# Patient Record
(demographics unavailable — no encounter records)

---

## 2025-07-09 NOTE — PHYSICAL EXAM
[Alert] : alert [Normal Voice/Communication] : normal voice/communication [No Acute Distress] : no acute distress [Well Developed] : well developed [Obese (BMI >= 30)] : obese (BMI >= 30) [Sclera] : the sclera and conjunctiva were normal [Hearing Threshold Finger Rub Not [place]] : hearing was normal [Normal Appearance] : the appearance of the neck was normal [No Respiratory Distress] : no respiratory distress [No Acc Muscle Use] : no accessory muscle use [Respiration, Rhythm And Depth] : normal respiratory rhythm and effort [Auscultation Breath Sounds / Voice Sounds] : lungs were clear to auscultation bilaterally [Heart Rate And Rhythm] : heart rate was normal and rhythm regular [Normal S1, S2] : normal S1 and S2 [Bowel Sounds] : normal bowel sounds [Abdomen Tenderness] : non-tender [No Masses] : no abdominal mass palpated [Abdomen Soft] : soft [Abnormal Walk] : normal gait [Normal Color / Pigmentation] : normal skin color and pigmentation [Oriented To Time, Place, And Person] : oriented to person, place, and time

## 2025-07-09 NOTE — PHYSICAL EXAM
[Alert] : alert [Normal Voice/Communication] : normal voice/communication [No Acute Distress] : no acute distress [Well Developed] : well developed [Obese (BMI >= 30)] : obese (BMI >= 30) [Sclera] : the sclera and conjunctiva were normal [Hearing Threshold Finger Rub Not [place]] : hearing was normal [Normal Appearance] : the appearance of the neck was normal [No Respiratory Distress] : no respiratory distress [No Acc Muscle Use] : no accessory muscle use [Respiration, Rhythm And Depth] : normal respiratory rhythm and effort [Auscultation Breath Sounds / Voice Sounds] : lungs were clear to auscultation bilaterally [Heart Rate And Rhythm] : heart rate was normal and rhythm regular [Normal S1, S2] : normal S1 and S2 [Bowel Sounds] : normal bowel sounds [Abdomen Tenderness] : non-tender [No Masses] : no abdominal mass palpated [Abdomen Soft] : soft [Abnormal Walk] : normal gait [Normal Color / Pigmentation] : normal skin color and pigmentation [Oriented To Time, Place, And Person] : oriented to person, place, and time

## 2025-07-15 NOTE — ASSESSMENT
[FreeTextEntry1] : 28 yr old male with H/O Heartburn, H/O Gastroparesis, ? H Pylori (treated about 3 yrs ago), H/O Splenomegaly 14 yrs ago, presents here for F/U of the gastroparesis with a gastric emptying study , needs clearance prior to starting the [employer].  H/O gastroparesis - Will repeat GE study to further assess since he has not had any GI sx in almost 2 yrs - He needs to have this study done prior to clearing him for the [employer]; will call pt when the results are back  H/O Splenomegaly - Will get an US of Abdomen to further assess the spleen - He needs clearance for this as well; will call patient when the results are back

## 2025-07-15 NOTE — HISTORY OF PRESENT ILLNESS
[FreeTextEntry1] : 28 yr old male with H/O Heartburn, H/O Gastroparesis, ? H Pylori (treated about 3 yrs ago), H/O Splenomegaly 14 yrs ago, presents here for F/U of the gastroparesis with a gastric emptying study , needs clearance prior to starting the [employer].  He denied heartburn, abd pain, weight loss, dysphagia, blood in the stool, vomiting, constipation, or diarrhea.  He stopped omeprazole 40 mg, Famotidine 40 mg, and Ondansetron 4 mg 1 1/2 to 2 yrs ago without any GI symptoms since then.